# Patient Record
Sex: FEMALE | Race: WHITE | NOT HISPANIC OR LATINO | Employment: UNEMPLOYED | URBAN - NONMETROPOLITAN AREA
[De-identification: names, ages, dates, MRNs, and addresses within clinical notes are randomized per-mention and may not be internally consistent; named-entity substitution may affect disease eponyms.]

---

## 2017-10-06 ENCOUNTER — APPOINTMENT (OUTPATIENT)
Dept: LAB | Facility: HOSPITAL | Age: 27
End: 2017-10-06

## 2017-10-06 ENCOUNTER — TELEPHONE (OUTPATIENT)
Dept: OBSTETRICS AND GYNECOLOGY | Facility: CLINIC | Age: 27
End: 2017-10-06

## 2017-10-06 ENCOUNTER — OFFICE VISIT (OUTPATIENT)
Dept: OBSTETRICS AND GYNECOLOGY | Facility: CLINIC | Age: 27
End: 2017-10-06

## 2017-10-06 VITALS
HEART RATE: 97 BPM | SYSTOLIC BLOOD PRESSURE: 135 MMHG | DIASTOLIC BLOOD PRESSURE: 85 MMHG | WEIGHT: 293 LBS | HEIGHT: 63 IN | BODY MASS INDEX: 51.91 KG/M2

## 2017-10-06 DIAGNOSIS — Z87.42 HISTORY OF PCOS: ICD-10-CM

## 2017-10-06 DIAGNOSIS — E66.01 MORBID OBESITY WITH BMI OF 60.0-69.9, ADULT (HCC): ICD-10-CM

## 2017-10-06 DIAGNOSIS — N92.6 IRREGULAR PERIODS: ICD-10-CM

## 2017-10-06 DIAGNOSIS — Z32.02 PREGNANCY EXAMINATION OR TEST, NEGATIVE RESULT: Primary | ICD-10-CM

## 2017-10-06 LAB — HCG INTACT+B SERPL-ACNC: <2.4 MIU/ML

## 2017-10-06 PROCEDURE — 99214 OFFICE O/P EST MOD 30 MIN: CPT | Performed by: NURSE PRACTITIONER

## 2017-10-06 PROCEDURE — 36415 COLL VENOUS BLD VENIPUNCTURE: CPT | Performed by: NURSE PRACTITIONER

## 2017-10-06 PROCEDURE — 84702 CHORIONIC GONADOTROPIN TEST: CPT | Performed by: NURSE PRACTITIONER

## 2017-10-06 RX ORDER — LORATADINE 10 MG/1
10 TABLET ORAL
COMMUNITY
Start: 2017-09-29 | End: 2017-10-30

## 2017-10-06 RX ORDER — ALBUTEROL SULFATE 90 UG/1
2 AEROSOL, METERED RESPIRATORY (INHALATION)
COMMUNITY
Start: 2017-09-29 | End: 2017-10-30

## 2017-10-06 RX ORDER — METFORMIN HYDROCHLORIDE 500 MG/1
500 TABLET, EXTENDED RELEASE ORAL 2 TIMES DAILY
Qty: 60 TABLET | Refills: 3 | Status: SHIPPED | OUTPATIENT
Start: 2017-10-06 | End: 2018-01-30 | Stop reason: SDUPTHER

## 2017-10-06 NOTE — TELEPHONE ENCOUNTER
----- Message from GAURAV Florez sent at 10/6/2017  1:25 PM CDT -----  Please let her know that she serum test confirmed that she is not pregnant.

## 2017-10-10 ENCOUNTER — LAB (OUTPATIENT)
Dept: LAB | Facility: HOSPITAL | Age: 27
End: 2017-10-10

## 2017-10-10 DIAGNOSIS — E66.01 MORBID OBESITY WITH BMI OF 60.0-69.9, ADULT (HCC): ICD-10-CM

## 2017-10-10 DIAGNOSIS — N92.6 IRREGULAR PERIODS: ICD-10-CM

## 2017-10-10 LAB
ALBUMIN SERPL-MCNC: 4.1 G/DL (ref 3.4–4.8)
ALBUMIN/GLOB SERPL: 1.2 G/DL (ref 1.1–1.8)
ALP SERPL-CCNC: 69 U/L (ref 38–126)
ALT SERPL W P-5'-P-CCNC: 29 U/L (ref 9–52)
ANION GAP SERPL CALCULATED.3IONS-SCNC: 10 MMOL/L (ref 5–15)
ARTICHOKE IGE QN: 125 MG/DL (ref 1–129)
AST SERPL-CCNC: 24 U/L (ref 14–36)
BASOPHILS # BLD AUTO: 0.05 10*3/MM3 (ref 0–0.2)
BASOPHILS NFR BLD AUTO: 0.6 % (ref 0–2)
BILIRUB SERPL-MCNC: 0.4 MG/DL (ref 0.2–1.3)
BUN BLD-MCNC: 11 MG/DL (ref 7–21)
BUN/CREAT SERPL: 16.7 (ref 7–25)
CALCIUM SPEC-SCNC: 11.1 MG/DL (ref 8.4–10.2)
CHLORIDE SERPL-SCNC: 102 MMOL/L (ref 95–110)
CHOLEST SERPL-MCNC: 222 MG/DL (ref 0–199)
CO2 SERPL-SCNC: 27 MMOL/L (ref 22–31)
CREAT BLD-MCNC: 0.66 MG/DL (ref 0.5–1)
DEPRECATED RDW RBC AUTO: 49.9 FL (ref 36.4–46.3)
EOSINOPHIL # BLD AUTO: 0.22 10*3/MM3 (ref 0–0.7)
EOSINOPHIL NFR BLD AUTO: 2.5 % (ref 0–7)
ERYTHROCYTE [DISTWIDTH] IN BLOOD BY AUTOMATED COUNT: 14.6 % (ref 11.5–14.5)
GFR SERPL CREATININE-BSD FRML MDRD: 107 ML/MIN/1.73 (ref 71–165)
GLOBULIN UR ELPH-MCNC: 3.3 GM/DL (ref 2.3–3.5)
GLUCOSE BLD-MCNC: 95 MG/DL (ref 60–100)
HBA1C MFR BLD: 5.9 % (ref 4–5.6)
HCT VFR BLD AUTO: 43.8 % (ref 35–45)
HDLC SERPL-MCNC: 33 MG/DL (ref 60–200)
HGB BLD-MCNC: 14.2 G/DL (ref 12–15.5)
IMM GRANULOCYTES # BLD: 0.03 10*3/MM3 (ref 0–0.02)
IMM GRANULOCYTES NFR BLD: 0.3 % (ref 0–0.5)
LDLC/HDLC SERPL: ABNORMAL {RATIO} (ref 0–3.22)
LYMPHOCYTES # BLD AUTO: 3.38 10*3/MM3 (ref 0.6–4.2)
LYMPHOCYTES NFR BLD AUTO: 38.2 % (ref 10–50)
MCH RBC QN AUTO: 30 PG (ref 26.5–34)
MCHC RBC AUTO-ENTMCNC: 32.4 G/DL (ref 31.4–36)
MCV RBC AUTO: 92.4 FL (ref 80–98)
MONOCYTES # BLD AUTO: 0.7 10*3/MM3 (ref 0–0.9)
MONOCYTES NFR BLD AUTO: 7.9 % (ref 0–12)
NEUTROPHILS # BLD AUTO: 4.47 10*3/MM3 (ref 2–8.6)
NEUTROPHILS NFR BLD AUTO: 50.5 % (ref 37–80)
PLATELET # BLD AUTO: 262 10*3/MM3 (ref 150–450)
PMV BLD AUTO: 12.5 FL (ref 8–12)
POTASSIUM BLD-SCNC: 4.2 MMOL/L (ref 3.5–5.1)
PROT SERPL-MCNC: 7.4 G/DL (ref 6.3–8.6)
RBC # BLD AUTO: 4.74 10*6/MM3 (ref 3.77–5.16)
SODIUM BLD-SCNC: 139 MMOL/L (ref 137–145)
TRIGL SERPL-MCNC: 425 MG/DL (ref 20–199)
TSH SERPL DL<=0.05 MIU/L-ACNC: 5.37 MIU/ML (ref 0.46–4.68)
WBC NRBC COR # BLD: 8.85 10*3/MM3 (ref 3.2–9.8)

## 2017-10-10 PROCEDURE — 36415 COLL VENOUS BLD VENIPUNCTURE: CPT

## 2017-10-10 PROCEDURE — 80053 COMPREHEN METABOLIC PANEL: CPT | Performed by: NURSE PRACTITIONER

## 2017-10-10 PROCEDURE — 84402 ASSAY OF FREE TESTOSTERONE: CPT | Performed by: NURSE PRACTITIONER

## 2017-10-10 PROCEDURE — 84443 ASSAY THYROID STIM HORMONE: CPT | Performed by: NURSE PRACTITIONER

## 2017-10-10 PROCEDURE — 83036 HEMOGLOBIN GLYCOSYLATED A1C: CPT | Performed by: NURSE PRACTITIONER

## 2017-10-10 PROCEDURE — 84439 ASSAY OF FREE THYROXINE: CPT | Performed by: NURSE PRACTITIONER

## 2017-10-10 PROCEDURE — 84480 ASSAY TRIIODOTHYRONINE (T3): CPT | Performed by: NURSE PRACTITIONER

## 2017-10-10 PROCEDURE — 83525 ASSAY OF INSULIN: CPT | Performed by: NURSE PRACTITIONER

## 2017-10-10 PROCEDURE — 84146 ASSAY OF PROLACTIN: CPT | Performed by: NURSE PRACTITIONER

## 2017-10-10 PROCEDURE — 85025 COMPLETE CBC W/AUTO DIFF WBC: CPT | Performed by: NURSE PRACTITIONER

## 2017-10-10 PROCEDURE — 84403 ASSAY OF TOTAL TESTOSTERONE: CPT | Performed by: NURSE PRACTITIONER

## 2017-10-10 PROCEDURE — 82627 DEHYDROEPIANDROSTERONE: CPT | Performed by: NURSE PRACTITIONER

## 2017-10-10 PROCEDURE — 80061 LIPID PANEL: CPT | Performed by: NURSE PRACTITIONER

## 2017-10-11 DIAGNOSIS — R79.89 ABNORMAL TSH: Primary | ICD-10-CM

## 2017-10-11 LAB
DHEA-S SERPL-MCNC: 261.2 UG/DL (ref 84.8–378)
INSULIN SERPL-ACNC: 22.6 UIU/ML (ref 2.6–24.9)
PROLACTIN SERPL-MCNC: 12.5 NG/ML (ref 4.8–23.3)
T3 SERPL-MCNC: 138 NG/DL (ref 97–169)
T4 FREE SERPL-MCNC: 0.84 NG/DL (ref 0.78–2.19)

## 2017-10-17 ENCOUNTER — OFFICE VISIT (OUTPATIENT)
Dept: OBSTETRICS AND GYNECOLOGY | Facility: CLINIC | Age: 27
End: 2017-10-17

## 2017-10-17 VITALS
HEIGHT: 63 IN | DIASTOLIC BLOOD PRESSURE: 84 MMHG | BODY MASS INDEX: 51.91 KG/M2 | SYSTOLIC BLOOD PRESSURE: 123 MMHG | WEIGHT: 293 LBS | HEART RATE: 102 BPM

## 2017-10-17 DIAGNOSIS — E66.01 MORBID OBESITY WITH BMI OF 60.0-69.9, ADULT (HCC): ICD-10-CM

## 2017-10-17 DIAGNOSIS — N92.6 IRREGULAR PERIODS: Primary | ICD-10-CM

## 2017-10-17 DIAGNOSIS — Z30.011 ENCOUNTER FOR INITIAL PRESCRIPTION OF CONTRACEPTIVE PILLS: ICD-10-CM

## 2017-10-17 DIAGNOSIS — E78.2 MIXED HYPERCHOLESTEROLEMIA AND HYPERTRIGLYCERIDEMIA: ICD-10-CM

## 2017-10-17 DIAGNOSIS — R79.89 ABNORMAL THYROID BLOOD TEST: ICD-10-CM

## 2017-10-17 PROCEDURE — 99214 OFFICE O/P EST MOD 30 MIN: CPT | Performed by: NURSE PRACTITIONER

## 2017-10-17 RX ORDER — MEDROXYPROGESTERONE ACETATE 10 MG/1
10 TABLET ORAL DAILY
Qty: 10 TABLET | Refills: 0 | Status: SHIPPED | OUTPATIENT
Start: 2017-10-17 | End: 2018-01-30

## 2017-10-17 RX ORDER — NORETHINDRONE ACETATE AND ETHINYL ESTRADIOL 1MG-20(21)
1 KIT ORAL DAILY
Qty: 28 TABLET | Refills: 3 | Status: SHIPPED | OUTPATIENT
Start: 2017-10-17 | End: 2018-01-30 | Stop reason: SDUPTHER

## 2017-10-17 NOTE — PROGRESS NOTES
Subjective   Abelardo Berry is a 27 y.o. female. G0 here for lab results. Still has not had a period since 7/1/17.    Amenorrhea   This is a chronic problem. The current episode started more than 1 month ago. The problem occurs daily. The problem has been unchanged. Pertinent negatives include no abdominal pain, chest pain, diaphoresis, fatigue, headaches, nausea or vomiting.     LMP- 7/1/17    The following portions of the patient's history were reviewed and updated as appropriate: allergies, current medications, past family history, past medical history, past social history, past surgical history and problem list.    Review of Systems   Constitutional: Negative for diaphoresis and fatigue.   Respiratory: Negative for chest tightness and shortness of breath.    Cardiovascular: Negative for chest pain and palpitations.   Gastrointestinal: Negative for abdominal pain, nausea and vomiting.   Genitourinary: Positive for menstrual problem. Negative for pelvic pain.   Neurological: Negative for light-headedness and headaches.       Objective   Physical Exam   Constitutional: She is oriented to person, place, and time.   Cardiovascular: Normal rate, regular rhythm, normal heart sounds and intact distal pulses.    Pulmonary/Chest: Effort normal and breath sounds normal.   Neurological: She is alert and oriented to person, place, and time.   Psychiatric: She has a normal mood and affect. Her behavior is normal.       Component      Latest Ref Rng & Units 5/27/2016 10/10/2017   WBC      3.20 - 9.80 10*3/mm3  8.85   RBC      3.77 - 5.16 10*6/mm3  4.74   Hemoglobin      12.0 - 15.5 g/dL  14.2   Hematocrit      35.0 - 45.0 %  43.8   MCV      80.0 - 98.0 fL  92.4   MCH      26.5 - 34.0 pg  30.0   MCHC      31.4 - 36.0 g/dL  32.4   RDW      11.5 - 14.5 %  14.6 (H)   RDW-SD      36.4 - 46.3 fl  49.9 (H)   MPV      8.0 - 12.0 fL  12.5 (H)   Platelets      150 - 450 10*3/mm3  262   Neutrophil %      37.0 - 80.0 %  50.5    Lymphocyte %      10.0 - 50.0 %  38.2   Monocyte %      0.0 - 12.0 %  7.9   Eosinophil %      0.0 - 7.0 %  2.5   Basophil %      0.0 - 2.0 %  0.6   Immature Grans %      0.0 - 0.5 %  0.3   Neutrophils, Absolute      2.00 - 8.60 10*3/mm3  4.47   Lymphocytes, Absolute      0.60 - 4.20 10*3/mm3  3.38   Monocytes, Absolute      0.00 - 0.90 10*3/mm3  0.70   Eosinophils, Absolute      0.00 - 0.70 10*3/mm3  0.22   Basophils, Absolute      0.00 - 0.20 10*3/mm3  0.05   Immature Grans, Absolute      0.00 - 0.02 10*3/mm3  0.03 (H)   Glucose      60 - 100 mg/dL  95   BUN      7 - 21 mg/dL  11   Creatinine      0.50 - 1.00 mg/dL  0.66   Sodium      137 - 145 mmol/L  139   Potassium      3.5 - 5.1 mmol/L  4.2   Chloride      95 - 110 mmol/L  102   CO2      22.0 - 31.0 mmol/L  27.0   Calcium      8.4 - 10.2 mg/dL  11.1 (H)   Total Protein      6.3 - 8.6 g/dL  7.4   Albumin      3.40 - 4.80 g/dL  4.10   ALT (SGPT)      9 - 52 U/L  29   AST (SGOT)      14 - 36 U/L  24   Alkaline Phosphatase      38 - 126 U/L  69   Total Bilirubin      0.2 - 1.3 mg/dL  0.4   eGFR Non  Amer      71 - 165 mL/min/1.73  107   Globulin      2.3 - 3.5 gm/dL  3.3   A/G Ratio      1.1 - 1.8 g/dL  1.2   BUN/Creatinine Ratio      7.0 - 25.0  16.7   Anion Gap      5.0 - 15.0 mmol/L  10.0   Total Cholesterol      0 - 199 mg/dL  222 (H)   Triglycerides      20 - 199 mg/dL  425 (H)   HDL Cholesterol      60 - 200 mg/dL  33 (L)   LDL Cholesterol       1 - 129 mg/dL  125   LDL/HDL Ratio      0.00 - 3.22     Testosterone, Total      10.0 - 55.0 ng/dL 27.5    Testosterone, Free      0.10 - 0.85 ng/dL 0.73    Testosterone, Free %      0.50 - 2.80 % 2.64    FSH      mIU/mL 4.6    TSH Baseline      0.460 - 4.680 mIU/mL 3.28 5.370 (H)   Estradiol      pg/mL 45.0    DHEA-Sulfate      84.8 - 378.0 ug/dL  261.2   Hemoglobin A1C      4 - 5.6 %  5.9 (H)   Insulin      2.6 - 24.9 uIU/mL  22.6   Prolactin      4.8 - 23.3 ng/mL  12.5   T3, Total      97.0 - 169.0 ng/dl   138.0   Free T4      0.78 - 2.19 ng/dL  0.84       Assessment/Plan   Abelardo was seen today for follow-up.    Diagnoses and all orders for this visit:    Irregular periods    Morbid obesity with BMI of 60.0-69.9, adult    Mixed hypercholesterolemia and hypertriglyceridemia    Abnormal thyroid blood test    Encounter for initial prescription of contraceptive pills    Other orders  -     medroxyPROGESTERone (PROVERA) 10 MG tablet; Take 1 tablet by mouth Daily.  -     norethindrone-ethinyl estradiol FE (JUNEL FE 1/20) 1-20 MG-MCG per tablet; Take 1 tablet by mouth Daily.       She was instructed to decrease greasy, fatty foods and increase leafy green vegetables. She was also encouraged to follow up with a primary care provider for her abnormal triglycerides, total cholesterol, A1C and TSH. Her primary goal at this time is to get healthier, continue to try to lose weight and we'll readdress her desire to have a baby at that time. At this time the cardiovascular risks with using clomid is higher than what either of us are comfortable with; we'll readdress in 6 months.

## 2017-10-20 LAB
TESTOST FREE MFR SERPL: 2.55 % (ref 0.5–2.8)
TESTOST FREE SERPL-MCNC: 0.93 NG/DL (ref 0.1–0.85)
TESTOST SERPL-MCNC: 36.5 NG/DL (ref 10–55)

## 2018-01-30 ENCOUNTER — OFFICE VISIT (OUTPATIENT)
Dept: OBSTETRICS AND GYNECOLOGY | Facility: CLINIC | Age: 28
End: 2018-01-30

## 2018-01-30 VITALS
SYSTOLIC BLOOD PRESSURE: 136 MMHG | HEIGHT: 63 IN | HEART RATE: 101 BPM | WEIGHT: 293 LBS | BODY MASS INDEX: 51.91 KG/M2 | DIASTOLIC BLOOD PRESSURE: 90 MMHG

## 2018-01-30 DIAGNOSIS — R79.89 ABNORMAL THYROID BLOOD TEST: ICD-10-CM

## 2018-01-30 DIAGNOSIS — E28.2 PCOS (POLYCYSTIC OVARIAN SYNDROME): ICD-10-CM

## 2018-01-30 DIAGNOSIS — Z30.41 ENCOUNTER FOR SURVEILLANCE OF CONTRACEPTIVE PILLS: Primary | ICD-10-CM

## 2018-01-30 DIAGNOSIS — E78.2 MIXED HYPERCHOLESTEROLEMIA AND HYPERTRIGLYCERIDEMIA: ICD-10-CM

## 2018-01-30 DIAGNOSIS — E66.01 MORBID OBESITY WITH BMI OF 60.0-69.9, ADULT (HCC): ICD-10-CM

## 2018-01-30 PROCEDURE — 99213 OFFICE O/P EST LOW 20 MIN: CPT | Performed by: NURSE PRACTITIONER

## 2018-01-30 RX ORDER — METFORMIN HYDROCHLORIDE 500 MG/1
1000 TABLET, EXTENDED RELEASE ORAL
Qty: 180 TABLET | Refills: 12 | Status: SHIPPED | OUTPATIENT
Start: 2018-01-30

## 2018-01-30 RX ORDER — NORETHINDRONE ACETATE AND ETHINYL ESTRADIOL 1MG-20(21)
1 KIT ORAL DAILY
Qty: 28 TABLET | Refills: 12 | Status: SHIPPED | OUTPATIENT
Start: 2018-01-30 | End: 2019-01-30

## 2018-02-02 PROBLEM — Z30.41 ENCOUNTER FOR SURVEILLANCE OF CONTRACEPTIVE PILLS: Status: ACTIVE | Noted: 2017-10-17

## 2018-02-02 NOTE — PROGRESS NOTES
Subjective   Abelardo Berry is a 27 y.o. female. G0 here for 3 month f/u on OCPs and metformin. Reports no issues at this time.    HPI Comments: LMP- 2 weeks ago    Reports that she is having a monthly period with the OCPs and having no trouble taking metformin once daily.    Contraception   This is a new problem. The current episode started more than 1 month ago. The problem occurs daily. The problem has been resolved. Pertinent negatives include no abdominal pain, chest pain, fatigue, headaches, myalgias, nausea, vomiting or weakness. Nothing aggravates the symptoms. She has tried nothing for the symptoms.       The following portions of the patient's history were reviewed and updated as appropriate: allergies, current medications, past medical history, past social history, past surgical history and problem list.    Review of Systems   Constitutional: Negative for activity change, appetite change, fatigue and unexpected weight change.   Respiratory: Negative for chest tightness and shortness of breath.    Cardiovascular: Negative for chest pain, palpitations and leg swelling.   Gastrointestinal: Negative for abdominal distention, abdominal pain, diarrhea, nausea and vomiting.   Genitourinary: Negative for difficulty urinating, dysuria, menstrual problem, pelvic pain, vaginal bleeding, vaginal discharge and vaginal pain.   Musculoskeletal: Negative for myalgias.   Neurological: Negative for weakness, light-headedness and headaches.   Psychiatric/Behavioral: Negative for agitation, dysphoric mood and sleep disturbance. The patient is not nervous/anxious.        Objective   Physical Exam   Constitutional: She is oriented to person, place, and time. She appears well-developed and well-nourished.   Cardiovascular: Normal rate, regular rhythm, normal heart sounds and intact distal pulses.    Pulmonary/Chest: Effort normal and breath sounds normal.   Neurological: She is alert and oriented to person, place, and  time.   Skin: Skin is warm and dry.   Psychiatric: She has a normal mood and affect. Her behavior is normal.   Nursing note and vitals reviewed.      Assessment/Plan   Abelardo was seen today for follow-up.    Diagnoses and all orders for this visit:    Encounter for surveillance of contraceptive pills    Mixed hypercholesterolemia and hypertriglyceridemia  -     Lipid Panel; Future    PCOS (polycystic ovarian syndrome)  -     Comprehensive Metabolic Panel; Future  -     Hemoglobin A1c; Future  -     Insulin, Total; Future    Abnormal thyroid blood test  -     TSH; Future    Morbid obesity with BMI of 60.0-69.9, adult    Other orders  -     metFORMIN ER (GLUCOPHAGE XR) 500 MG 24 hr tablet; Take 2 tablets by mouth 2 (Two) Times a Day.  -     norethindrone-ethinyl estradiol FE (JUNEL FE 1/20) 1-20 MG-MCG per tablet; Take 1 tablet by mouth Daily.     Still has not established with a PCP regarding her abnormal thyroid hormones. Instructed to call [place] medicaid to find out who her assigned PCP is so she can schedule an appt. Continue taking OCPs and increase metformin to BID with food. Encouraged healthy, low carb diet and increased water intake. F/U in 3 months with fasting labs 3-4 days before appt. Still wanting to have a baby asap, but understands that her health is most important at this time.

## 2018-04-24 ENCOUNTER — OFFICE VISIT (OUTPATIENT)
Dept: OBSTETRICS AND GYNECOLOGY | Facility: CLINIC | Age: 28
End: 2018-04-24

## 2018-04-24 ENCOUNTER — LAB (OUTPATIENT)
Dept: LAB | Facility: HOSPITAL | Age: 28
End: 2018-04-24

## 2018-04-24 VITALS
WEIGHT: 293 LBS | DIASTOLIC BLOOD PRESSURE: 83 MMHG | HEART RATE: 86 BPM | SYSTOLIC BLOOD PRESSURE: 123 MMHG | HEIGHT: 63 IN | BODY MASS INDEX: 51.91 KG/M2

## 2018-04-24 DIAGNOSIS — E78.2 MIXED HYPERCHOLESTEROLEMIA AND HYPERTRIGLYCERIDEMIA: ICD-10-CM

## 2018-04-24 DIAGNOSIS — E28.2 PCOS (POLYCYSTIC OVARIAN SYNDROME): ICD-10-CM

## 2018-04-24 DIAGNOSIS — Z30.41 ENCOUNTER FOR SURVEILLANCE OF CONTRACEPTIVE PILLS: Primary | ICD-10-CM

## 2018-04-24 DIAGNOSIS — R79.89 ABNORMAL THYROID BLOOD TEST: ICD-10-CM

## 2018-04-24 DIAGNOSIS — E66.01 MORBID OBESITY WITH BMI OF 60.0-69.9, ADULT (HCC): ICD-10-CM

## 2018-04-24 LAB
ALBUMIN SERPL-MCNC: 3.9 G/DL (ref 3.4–4.8)
ALBUMIN/GLOB SERPL: 1.1 G/DL (ref 1.1–1.8)
ALP SERPL-CCNC: 65 U/L (ref 38–126)
ALT SERPL W P-5'-P-CCNC: 28 U/L (ref 9–52)
ANION GAP SERPL CALCULATED.3IONS-SCNC: 13 MMOL/L (ref 5–15)
ARTICHOKE IGE QN: 84 MG/DL (ref 1–129)
AST SERPL-CCNC: 13 U/L (ref 14–36)
BILIRUB SERPL-MCNC: <0.1 MG/DL (ref 0.2–1.3)
BUN BLD-MCNC: 10 MG/DL (ref 7–21)
BUN/CREAT SERPL: 18.5 (ref 7–25)
CALCIUM SPEC-SCNC: 10.6 MG/DL (ref 8.4–10.2)
CHLORIDE SERPL-SCNC: 101 MMOL/L (ref 95–110)
CHOLEST SERPL-MCNC: 156 MG/DL (ref 0–199)
CO2 SERPL-SCNC: 23 MMOL/L (ref 22–31)
CREAT BLD-MCNC: 0.54 MG/DL (ref 0.5–1)
GFR SERPL CREATININE-BSD FRML MDRD: 134 ML/MIN/1.73 (ref 71–165)
GLOBULIN UR ELPH-MCNC: 3.4 GM/DL (ref 2.3–3.5)
GLUCOSE BLD-MCNC: 89 MG/DL (ref 60–100)
HBA1C MFR BLD: 5.9 % (ref 4–5.6)
HDLC SERPL-MCNC: 40 MG/DL (ref 60–200)
LDLC/HDLC SERPL: 1.78 {RATIO} (ref 0–3.22)
POTASSIUM BLD-SCNC: 4.2 MMOL/L (ref 3.5–5.1)
PROT SERPL-MCNC: 7.3 G/DL (ref 6.3–8.6)
SODIUM BLD-SCNC: 137 MMOL/L (ref 137–145)
TRIGL SERPL-MCNC: 224 MG/DL (ref 20–199)
TSH SERPL DL<=0.05 MIU/L-ACNC: 1.53 MIU/ML (ref 0.46–4.68)

## 2018-04-24 PROCEDURE — 83036 HEMOGLOBIN GLYCOSYLATED A1C: CPT

## 2018-04-24 PROCEDURE — 84443 ASSAY THYROID STIM HORMONE: CPT

## 2018-04-24 PROCEDURE — 80053 COMPREHEN METABOLIC PANEL: CPT

## 2018-04-24 PROCEDURE — 36415 COLL VENOUS BLD VENIPUNCTURE: CPT

## 2018-04-24 PROCEDURE — 83525 ASSAY OF INSULIN: CPT

## 2018-04-24 PROCEDURE — 99214 OFFICE O/P EST MOD 30 MIN: CPT | Performed by: NURSE PRACTITIONER

## 2018-04-24 PROCEDURE — 80061 LIPID PANEL: CPT

## 2018-04-24 NOTE — PROGRESS NOTES
Subjective   Abelardo Berry is a 27 y.o. female. G0 here for lab f/u; however, did not have labs drawn prior to her appointment as instructed. Still taking OCPs and metformin w/o difficulty.     Has lost 13lbs in the past 3 months with dietary changes and walking. Has not established with a PCP. States that she lives in [place] and her insurance told her that her PCP is a provider that is 30 miles from her home; she cannot afford to travel for all of her appointments.       Gynecologic Exam   The patient's pertinent negatives include no genital itching, genital lesions, genital odor, genital rash, missed menses, pelvic pain, vaginal bleeding or vaginal discharge. Pertinent negatives include no abdominal pain, diarrhea, headaches, nausea, rash or vomiting. She is sexually active. No, her partner does not have an STD. She uses oral contraceptives for contraception. Her menstrual history has been regular.       The following portions of the patient's history were reviewed and updated as appropriate: allergies, current medications, past medical history, past social history, past surgical history and problem list.    Review of Systems   Constitutional: Negative for activity change, appetite change, fatigue and unexpected weight change.   Respiratory: Negative for chest tightness and shortness of breath.    Cardiovascular: Negative for chest pain and palpitations.   Gastrointestinal: Negative for abdominal distention, abdominal pain, diarrhea, nausea and vomiting.   Genitourinary: Negative for menstrual problem, missed menses, pelvic pain, vaginal bleeding, vaginal discharge and vaginal pain.   Musculoskeletal: Negative for myalgias.   Skin: Negative for color change and rash.   Neurological: Negative for dizziness, weakness, light-headedness and headaches.   Psychiatric/Behavioral: Negative for dysphoric mood and sleep disturbance. The patient is not nervous/anxious.        Objective   Physical Exam    Constitutional: She is oriented to person, place, and time. Vital signs are normal. She appears well-developed and well-nourished. No distress.   Cardiovascular: Normal rate, regular rhythm and normal heart sounds.    Pulmonary/Chest: Effort normal and breath sounds normal.   Neurological: She is alert and oriented to person, place, and time.   Skin: Skin is warm, dry and intact. Capillary refill takes less than 2 seconds. She is not diaphoretic.   Psychiatric: She has a normal mood and affect. Her behavior is normal.   Nursing note and vitals reviewed.      Assessment/Plan   Abelardo was seen today for follow-up.    Diagnoses and all orders for this visit:    Encounter for surveillance of contraceptive pills    Abnormal thyroid blood test    PCOS (polycystic ovarian syndrome)    Morbid obesity with BMI of 60.0-69.9, adult    Mixed hypercholesterolemia and hypertriglyceridemia       Will have labs drawn today. Will call with results. If her triglycerides/cholesterol are decreasing the she will not need a medication at this time but it is recommended to continue trying to lose weight prior to conception for another 3-4 months. If her labs have not improved she will have to find a PCP to follow up with or we cannot consider clomid to help her conceive in the future if needed.

## 2018-04-25 LAB — INSULIN SERPL-ACNC: 19.8 UIU/ML (ref 2.6–24.9)

## 2018-04-27 ENCOUNTER — TELEPHONE (OUTPATIENT)
Dept: OBSTETRICS AND GYNECOLOGY | Facility: CLINIC | Age: 28
End: 2018-04-27

## 2018-04-27 NOTE — TELEPHONE ENCOUNTER
----- Message from GAURAV Florez sent at 4/26/2018 11:24 AM CDT -----  Please let her know that her cholesterol and triglycerides have greatly improved but are still not quite in the normal range. She can continue diet and exercise to continue to improve this. All other labs are normal. When she is ready to try for a baby in a few months, stop the OCPs at the end of the pack and start tracking cycles for 3 months. If she's not having periods every month after 3 months, she needs to see me again to discuss clomid.